# Patient Record
Sex: MALE | Race: WHITE | NOT HISPANIC OR LATINO | URBAN - METROPOLITAN AREA
[De-identification: names, ages, dates, MRNs, and addresses within clinical notes are randomized per-mention and may not be internally consistent; named-entity substitution may affect disease eponyms.]

---

## 2017-08-18 ENCOUNTER — OFFICE VISIT (OUTPATIENT)
Dept: INTERNAL MEDICINE | Facility: CLINIC | Age: 67
End: 2017-08-18
Payer: MEDICARE

## 2017-08-18 ENCOUNTER — HOSPITAL ENCOUNTER (OUTPATIENT)
Dept: RADIOLOGY | Facility: HOSPITAL | Age: 67
Discharge: HOME | End: 2017-08-18
Attending: INTERNAL MEDICINE
Payer: MEDICARE

## 2017-08-18 VITALS
HEART RATE: 102 BPM | HEIGHT: 67 IN | SYSTOLIC BLOOD PRESSURE: 123 MMHG | WEIGHT: 172.38 LBS | OXYGEN SATURATION: 97 % | BODY MASS INDEX: 27.06 KG/M2 | DIASTOLIC BLOOD PRESSURE: 85 MMHG

## 2017-08-18 DIAGNOSIS — R06.02 SOB (SHORTNESS OF BREATH): ICD-10-CM

## 2017-08-18 DIAGNOSIS — F20.9 SCHIZOPHRENIA, UNSPECIFIED TYPE: ICD-10-CM

## 2017-08-18 DIAGNOSIS — R06.02 SOB (SHORTNESS OF BREATH): Primary | ICD-10-CM

## 2017-08-18 PROCEDURE — 99203 OFFICE O/P NEW LOW 30 MIN: CPT | Mod: PBBFAC,25 | Performed by: INTERNAL MEDICINE

## 2017-08-18 PROCEDURE — 99999 PR PBB SHADOW E&M-NEW PATIENT-LVL III: CPT | Mod: PBBFAC,,, | Performed by: INTERNAL MEDICINE

## 2017-08-18 PROCEDURE — 1159F MED LIST DOCD IN RCRD: CPT | Mod: ,,, | Performed by: INTERNAL MEDICINE

## 2017-08-18 PROCEDURE — 71020 XR CHEST PA AND LATERAL: CPT | Mod: TC

## 2017-08-18 PROCEDURE — 99203 OFFICE O/P NEW LOW 30 MIN: CPT | Mod: S$PBB,,, | Performed by: INTERNAL MEDICINE

## 2017-08-18 PROCEDURE — 1126F AMNT PAIN NOTED NONE PRSNT: CPT | Mod: ,,, | Performed by: INTERNAL MEDICINE

## 2017-08-18 PROCEDURE — 71020 XR CHEST PA AND LATERAL: CPT | Mod: 26,,, | Performed by: RADIOLOGY

## 2017-08-21 PROBLEM — F20.9 SCHIZOPHRENIA: Status: ACTIVE | Noted: 2017-08-21

## 2017-08-21 PROBLEM — R06.02 SOB (SHORTNESS OF BREATH): Status: ACTIVE | Noted: 2017-08-21

## 2017-08-21 NOTE — PROGRESS NOTES
HISTORY OF PRESENT ILLNESS:  Mr. Ramiro Quesada is a new patient to me, comes in   today to have his oxygen bottle refilled.  He has shortness of breath that he   gets intermittently, usually at nighttime.  [place]   [place] and says sometimes it causes him to have shortness of breath.  He   has never seen anybody for this shortness of breath or had any testing done.    He does have about a 60-pack-year history of smoking after smoking for about 30   years, 2 packs a day, but that he quit that about 15 years ago.  He does not   have any chest pain.  So he gets these intermittent shortness of breath spells,   but he cannot really tell me how long they last and he cannot really define how   often he has them, but he has these spells at night without any chest pain.  He   has no true orthopnea or PND.  He bought a portable oxygen bottle off [employer]   or [employer] or I do not know how he got it, but he states he bought it off the   [employer].  He just wants to fill that, and he wants to use it at night when he   needs it.  He has also oxygen mask with the bottle he has, seems to be intact,   but does not have any kind of regulator on it.  He states he has been having   shortness of breath at night for the last 10 years, but has not really gotten   any worse.    PAST MEDICAL HISTORY:  Gallbladder removed in 1990 and he was in the [employer].  He   was discharged after breakdown and diagnosis of schizophrenia; however, he used   to see a doctor in [place] a long time ago and they took him off his   Risperdal.  He denies any suicidal or homicidal thoughts or ideation.  He lives   by himself.  He does have a disability income, and says he does okay.  He is   able to drive without any difficulty.  He has been institutionalized since he   was in the [employer], which is the 1970s.  Otherwise, no new complaints today.    PHYSICAL EXAMINATION:  GENERAL:  He is a well-appearing gentleman, 66, in no acute  distress.  NECK:  Supple.  He has no JVD.  Thyroid is not enlarged.  CARDIOVASCULAR:  S1 and S2, regular rate and rhythm.  No extra heart sounds.  He   has no JVD.  LUNGS:  Unremarkable.  ABDOMEN:  Soft.  EXTREMITIES:  He is not having any edema of his lower extremities.    ASSESSMENT:  This shortness of breath that he has, I convinced him that we need   to work this up instead of just giving him oxygen.  I explained to him that he   has a pulse ox of 97 on room even with activity.  He does not need that.  So   right now, he does not qualify for oxygen, at which time, he says he will pay.    I have tried to explain to him that this is just not a real good sound plan, but   he would not accept that.  He says he has money to pay for it.  I told him it   might be expensive and that the oxygen bottle is not going to hold very much   oxygen and that it is a hazard.  After a long discussion to get him into a   workup of his problem, we are going to give him a prescription for a refill his   tank.  I am not sure how that is going to work, but he did seem more happy with   that.  We are going to check a BMP and CBC.  We will get pulmonary function   tests and get chest x-ray and get a chemistry.  I am going to see him back.  For   schizophrenia, we are going to talk about that a little bit more.  I think I   need to make sure we establish a relationship so he can trust me a little bit   more before doing anything with that.  If he has any problems before next visit,   he is going to give me a call.  I suspect we are going to have difficulty and   he is not going to be able to get oxygen, but I did discuss that with him     today.  We will look at the labs and move forward and try to find out what the   shortness of breath is.  I asked him to try to keep a diary or something where   we can track that shortness of breath a little bit more because everything is   very vague.      LANI  dd: 08/21/2017 17:04:59 (CDT)  td:  08/22/2017 03:56:46 (CDT)  Doc ID   #[id number]  Job ID #[id number]    CC:

## 2017-08-30 ENCOUNTER — TELEPHONE (OUTPATIENT)
Dept: INTERNAL MEDICINE | Facility: CLINIC | Age: 67
End: 2017-08-30

## 2017-09-21 ENCOUNTER — TELEPHONE (OUTPATIENT)
Dept: INTERNAL MEDICINE | Facility: CLINIC | Age: 67
End: 2017-09-21

## 2017-09-21 NOTE — TELEPHONE ENCOUNTER
----- Message from Patience Balbuena sent at 9/20/2017 12:40 PM CDT -----  Contact: pt   Pt said that the pharmacy said they cant refill his perscription said the dr needs to call it in     Said its for a oxygen bottle to be filled     Said the dr need to fax the prescription in to [phone] or he can call [phone]